# Patient Record
Sex: FEMALE | Race: BLACK OR AFRICAN AMERICAN | Employment: UNEMPLOYED | URBAN - METROPOLITAN AREA
[De-identification: names, ages, dates, MRNs, and addresses within clinical notes are randomized per-mention and may not be internally consistent; named-entity substitution may affect disease eponyms.]

---

## 2018-12-07 ENCOUNTER — HOSPITAL ENCOUNTER (EMERGENCY)
Age: 28
Discharge: HOME | End: 2018-12-07
Payer: MEDICARE

## 2018-12-07 VITALS
OXYGEN SATURATION: 98 % | WEIGHT: 197.44 LBS | RESPIRATION RATE: 17 BRPM | HEART RATE: 64 BPM | BODY MASS INDEX: 31.73 KG/M2 | HEIGHT: 66 IN | SYSTOLIC BLOOD PRESSURE: 151 MMHG | TEMPERATURE: 98.1 F | DIASTOLIC BLOOD PRESSURE: 87 MMHG

## 2018-12-07 DIAGNOSIS — R51.9 ACUTE NONINTRACTABLE HEADACHE, UNSPECIFIED HEADACHE TYPE: Primary | ICD-10-CM

## 2018-12-07 PROCEDURE — 99283 EMERGENCY DEPT VISIT LOW MDM: CPT

## 2018-12-07 RX ORDER — TETRACAINE HYDROCHLORIDE 5 MG/ML
1 SOLUTION OPHTHALMIC ONCE
Status: DISCONTINUED | OUTPATIENT
Start: 2018-12-07 | End: 2018-12-07 | Stop reason: HOSPADM

## 2018-12-07 ASSESSMENT — ENCOUNTER SYMPTOMS
EYE DISCHARGE: 0
EYE PAIN: 1
NAUSEA: 0
COLOR CHANGE: 0
VOMITING: 0

## 2018-12-07 ASSESSMENT — PAIN DESCRIPTION - PAIN TYPE: TYPE: ACUTE PAIN

## 2018-12-07 ASSESSMENT — PAIN DESCRIPTION - LOCATION: LOCATION: HEAD;EYE

## 2018-12-07 ASSESSMENT — PAIN SCALES - GENERAL: PAINLEVEL_OUTOF10: 6

## 2018-12-07 NOTE — ED NOTES
Pt alert and oriented to person, place, time and event. Pt answering questions appropriately, in full sentences without difficulty. Pt skin color is appropriate for patient and is warm and dry. Pt is able to ambulate without difficulty to ED exit. All questions and concerns were addressed and pt verbalized understanding. Pt was educated to follow instructions on DC paperwork or to return to ED if any new concerns arise. Pt currently has no new complaints and all treatments, provider orders for this visit are completed.       Candace Marvin RN  12/07/18 [id number]

## 2023-10-13 ENCOUNTER — HOSPITAL ENCOUNTER (EMERGENCY)
Facility: HOSPITAL | Age: 33
Discharge: HOME | End: 2023-10-13
Attending: EMERGENCY MEDICINE
Payer: COMMERCIAL

## 2023-10-13 VITALS
HEIGHT: 66 IN | WEIGHT: 230 LBS | HEART RATE: 85 BPM | RESPIRATION RATE: 18 BRPM | OXYGEN SATURATION: 97 % | TEMPERATURE: 99 F | BODY MASS INDEX: 36.96 KG/M2 | SYSTOLIC BLOOD PRESSURE: 134 MMHG | DIASTOLIC BLOOD PRESSURE: 94 MMHG

## 2023-10-13 DIAGNOSIS — J06.9 VIRAL URI WITH COUGH: Primary | ICD-10-CM

## 2023-10-13 LAB
FLUAV SUBTYP SPEC NAA+PROBE: NOT DETECTED
FLUBV RNA ISLT QL NAA+PROBE: NOT DETECTED
SARS-COV-2 RNA RESP QL NAA+PROBE: NOT DETECTED
STREP A PCR: NOT DETECTED

## 2023-10-13 PROCEDURE — 87651 STREP A DNA AMP PROBE: CPT

## 2023-10-13 PROCEDURE — 99283 EMERGENCY DEPT VISIT LOW MDM: CPT

## 2023-10-13 PROCEDURE — 99283 EMERGENCY DEPT VISIT LOW MDM: CPT | Performed by: EMERGENCY MEDICINE

## 2023-10-13 PROCEDURE — 87636 SARSCOV2 & INF A&B AMP PRB: CPT

## 2023-10-13 PROCEDURE — 63710000001 PREDNISONE PER 1 MG: Performed by: EMERGENCY MEDICINE

## 2023-10-13 RX ORDER — PREDNISONE 20 MG/1
20 TABLET ORAL 2 TIMES DAILY
Qty: 10 TABLET | Refills: 0 | Status: SHIPPED | OUTPATIENT
Start: 2023-10-13 | End: 2023-10-18

## 2023-10-13 RX ORDER — PREDNISONE 20 MG/1
20 TABLET ORAL ONCE
Status: COMPLETED | OUTPATIENT
Start: 2023-10-13 | End: 2023-10-13

## 2023-10-13 RX ADMIN — PREDNISONE 20 MG: 20 TABLET ORAL at 22:38

## 2023-10-14 NOTE — DISCHARGE INSTRUCTIONS
Please take prednisone twice a day as prescribed.  Drink plenty fluids to stay hydrated.  Please follow-up with your provider or contact patient connection if you need to establish with a primary provider.  Seek immediate medical attention at any time if having any concerns.

## 2023-10-14 NOTE — FSED PROVIDER NOTE
Subjective   History of Present Illness  Patient a 33-year-old woman who had developed nasal congestion symptoms with throat discomfort and loss of taste.  Symptoms all began approximately 5 to 6 days ago but have been gradually improving.  Today the patient began having cough symptoms with occasional mucus and sputum production.  Denies any shortness of breath or chest pain.  No vomiting or diarrhea.  No fevers.  Patient has been tolerating solids and liquids and voiding without difficulty.  Overall the patient feels her symptoms have improved.      Review of Systems    Past Medical History:   Diagnosis Date    MS (multiple sclerosis)        Allergies   Allergen Reactions    Oxycodone Unknown - High Severity    Penicillins Unknown - Low Severity       History reviewed. No pertinent surgical history.    History reviewed. No pertinent family history.    Social History     Socioeconomic History    Marital status: Single           Objective   Physical Exam  Vitals and nursing note reviewed.   Constitutional:       General: She is not in acute distress.     Appearance: Normal appearance. She is not ill-appearing or toxic-appearing.   HENT:      Head: Normocephalic and atraumatic.      Nose: Rhinorrhea present.      Mouth/Throat:      Mouth: Mucous membranes are moist.      Pharynx: Oropharynx is clear.   Eyes:      Extraocular Movements: Extraocular movements intact.      Conjunctiva/sclera: Conjunctivae normal.      Pupils: Pupils are equal, round, and reactive to light.   Cardiovascular:      Rate and Rhythm: Normal rate and regular rhythm.      Pulses: Normal pulses.      Heart sounds: Normal heart sounds.   Pulmonary:      Effort: Pulmonary effort is normal.      Breath sounds: Normal breath sounds.   Abdominal:      General: Bowel sounds are normal.      Palpations: Abdomen is soft.      Tenderness: There is no abdominal tenderness.   Musculoskeletal:         General: No swelling or tenderness. Normal range of  motion.      Cervical back: Normal range of motion and neck supple.      Right lower leg: No edema.      Left lower leg: No edema.   Skin:     General: Skin is warm and dry.      Capillary Refill: Capillary refill takes less than 2 seconds.   Neurological:      General: No focal deficit present.      Mental Status: She is alert.      Sensory: No sensory deficit.      Motor: No weakness.         Procedures           ED Course                                           Medical Decision Making  Problems Addressed:  Viral URI with cough: complicated acute illness or injury    Risk  Prescription drug management.    Patient with viral URI symptoms which began initially with nasal congestion and loss of taste and at times loss of voice.  Patient began having cough today with occasional sputum production.  Patient has not had any fevers or vomiting or diarrhea or chest pain or shortness of breath.  Patient feels her symptoms have improved.  On examination she appears well-nourished well-developed no acute distress.  Speaking full sentences without difficulty.  Lungs are clear to auscultation room air O2 sat 97% and pulse is less than 100.  Extremities without tenderness or swelling.  Patient had COVID test and flu test and strep test which were all negative.  Patient with be placed on prednisone and advised to return if any concerns.    Final diagnoses:   Viral URI with cough       ED Disposition  ED Disposition       ED Disposition   Discharge    Condition   Stable    Comment   --               PATIENT CONNECTION - [facility] [id number]  [phone]  Call in 1 week           Medication List        New Prescriptions      predniSONE 20 MG tablet  Commonly known as: DELTASONE  Take 1 tablet by mouth 2 (Two) Times a Day for 5 days.               Where to Get Your Medications        These medications were sent to [id number] [place], [place] - [address] AT [facility] OF [place] [address] & [facility] [address]  - [phone] [facility] [phone] FX  [address], [place] [address]      Phone: [phone]   predniSONE 20 MG tablet